# Patient Record
Sex: MALE | Race: WHITE | NOT HISPANIC OR LATINO | Employment: FULL TIME | URBAN - NONMETROPOLITAN AREA
[De-identification: names, ages, dates, MRNs, and addresses within clinical notes are randomized per-mention and may not be internally consistent; named-entity substitution may affect disease eponyms.]

---
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
## 2024-04-23 ENCOUNTER — OFFICE VISIT (OUTPATIENT)
Dept: INTERNAL MEDICINE | Facility: CLINIC | Age: 19
End: 2024-04-23
Payer: COMMERCIAL

## 2024-04-23 VITALS
RESPIRATION RATE: 20 BRPM | TEMPERATURE: 98 F | OXYGEN SATURATION: 97 % | HEART RATE: 74 BPM | SYSTOLIC BLOOD PRESSURE: 131 MMHG | HEIGHT: 76 IN | WEIGHT: 255 LBS | BODY MASS INDEX: 31.05 KG/M2 | DIASTOLIC BLOOD PRESSURE: 84 MMHG

## 2024-04-23 DIAGNOSIS — J30.1 ALLERGIC RHINITIS DUE TO POLLEN, UNSPECIFIED SEASONALITY: ICD-10-CM

## 2024-04-23 DIAGNOSIS — J45.20 MILD INTERMITTENT ASTHMA WITHOUT COMPLICATION: ICD-10-CM

## 2024-04-23 DIAGNOSIS — J45.20 MILD INTERMITTENT ASTHMA WITHOUT COMPLICATION: Primary | ICD-10-CM

## 2024-04-23 PROCEDURE — 99214 OFFICE O/P EST MOD 30 MIN: CPT | Performed by: INTERNAL MEDICINE

## 2024-04-23 RX ORDER — MONTELUKAST SODIUM 10 MG/1
10 TABLET ORAL NIGHTLY
Qty: 90 TABLET | Refills: 1 | Status: SHIPPED | OUTPATIENT
Start: 2024-04-23

## 2024-04-23 RX ORDER — ALBUTEROL SULFATE AND BUDESONIDE 90; 80 UG/1; UG/1
1 AEROSOL, METERED RESPIRATORY (INHALATION) EVERY 6 HOURS
Qty: 10.7 G | Refills: 4 | Status: SHIPPED | OUTPATIENT
Start: 2024-04-23

## 2024-04-23 RX ORDER — ALBUTEROL SULFATE AND BUDESONIDE 90; 80 UG/1; UG/1
1 AEROSOL, METERED RESPIRATORY (INHALATION) EVERY 6 HOURS
Qty: 10.7 G | Refills: 4 | Status: SHIPPED | OUTPATIENT
Start: 2024-04-23 | End: 2024-04-23 | Stop reason: SDUPTHER

## 2024-04-23 RX ORDER — MONTELUKAST SODIUM 10 MG/1
10 TABLET ORAL NIGHTLY
Qty: 90 TABLET | Refills: 1 | Status: SHIPPED | OUTPATIENT
Start: 2024-04-23 | End: 2024-04-23 | Stop reason: SDUPTHER

## 2024-04-23 NOTE — PROGRESS NOTES
"Chief Complaint  Asthma (Current living situation has changed causing flare up)    Subjective        Dylon Goldstein presents to [facility]  Patient is here complaining of asthma, he states he also has been having allergies, he currently is living with his girlfriend and she has a cat and he has an allergy to cats   Asthma  His past medical history is significant for asthma.       Objective   Vital Signs:  /84   Pulse 74   Temp 98 °F (36.7 °C)   Resp 20   Ht 193 cm (75.98\")   Wt 116 kg (255 lb)   SpO2 97%   BMI 31.05 kg/m²   Estimated body mass index is 31.05 kg/m² as calculated from the following:    Height as of this encounter: 193 cm (75.98\").    Weight as of this encounter: 116 kg (255 lb).       BMI is >= 30 and <35. (Class 1 Obesity). The following options were offered after discussion;: weight loss educational material (shared in after visit summary), exercise counseling/recommendations, and nutrition counseling/recommendations      Physical Exam  Vitals and nursing note reviewed.   Constitutional:       General: He is not in acute distress.     Appearance: Normal appearance. He is not diaphoretic.   HENT:      Head: Normocephalic and atraumatic.      Right Ear: External ear normal.      Left Ear: External ear normal.      Nose: Nose normal.   Eyes:      Extraocular Movements: Extraocular movements intact.      Conjunctiva/sclera: Conjunctivae normal.   Neck:      Trachea: Trachea normal.   Cardiovascular:      Rate and Rhythm: Normal rate and regular rhythm.      Heart sounds: Normal heart sounds.   Pulmonary:      Effort: Pulmonary effort is normal. No respiratory distress.   Abdominal:      General: Abdomen is flat.   Musculoskeletal:      Cervical back: Neck supple.      Comments: Moves all limbs   Skin:     General: Skin is warm and dry.      Findings: No erythema.   Neurological:      Mental Status: He is alert and oriented to person, place, and time.      " Comments: No gross motor or sensory deficits        Result Review :    The following data was reviewed by: Katie Jacinto MD on 04/23/2024:                 Assessment and Plan     Diagnoses and all orders for this visit:    1. Mild intermittent asthma without complication (Primary)  -     Discontinue: Albuterol-Budesonide (Airsupra) 90-80 MCG/ACT aerosol; Inhale 1 puff Every 6 (Six) Hours.  Dispense: 10.7 g; Refill: 4  -     Discontinue: montelukast (Singulair) 10 MG tablet; Take 1 tablet by mouth Every Night.  Dispense: 90 tablet; Refill: 1    2. Allergic rhinitis due to pollen, unspecified seasonality  -     Discontinue: montelukast (Singulair) 10 MG tablet; Take 1 tablet by mouth Every Night.  Dispense: 90 tablet; Refill: 1    Plan:  1.  Mild intermittent asthma: Will give a trial with a supra inhaler, and also with singular 10 mg daily   2. allergic rhinitis: Will give a trial with Singulair 10 mg daily, OTC antihistamine advised  for acute episodes         Follow Up     Return in about 23 weeks (around 10/1/2024) for Annual.  Patient was given instructions and counseling regarding his condition or for health maintenance advice. Please see specific information pulled into the AVS if appropriate.         Answers submitted by the patient for this visit:  Primary Reason for Visit (Submitted on 4/22/2024)  What is the primary reason for your visit?: Other  Other (Submitted on 4/22/2024)  Please describe your symptoms.: Allergies and asthma  Have you had these symptoms before?: Yes  How long have you been having these symptoms?: Greater than 2 weeks  Please list any medications you are currently taking for this condition.: Albuterol Sulfate inhaler  Please describe any probable cause for these symptoms. : Cat allergy triggers my asthma

## 2024-04-23 NOTE — TELEPHONE ENCOUNTER
"    Caller: Dylon Goldstein \"Ismael\"    Relationship: Self    Best call back number:[phone]     Requested Prescriptions:   Requested Prescriptions     Pending Prescriptions Disp Refills    montelukast (Singulair) 10 MG tablet 90 tablet 1     Sig: Take 1 tablet by mouth Every Night.    Albuterol-Budesonide (Airsupra) 90-80 MCG/ACT aerosol 10.7 g 4     Sig: Inhale 1 puff Every 6 (Six) Hours.        Pharmacy where request should be sent: [id number] - [facility] [address] [place] [address] [address] AT [facility] [phone] [facility] [phone]      Last office visit with prescribing clinician: 4/23/2024   Last telemedicine visit with prescribing clinician: Visit date not found   Next office visit with prescribing clinician: 10/1/2024     Additional details provided by patient: WAS NOT RECEIVED AT OTHER PHARMACY    Does the patient have less than a 3 day supply:  [] Yes  [x] No    Would you like a call back once the refill request has been completed: [] Yes [x] No    If the office needs to give you a call back, can they leave a voicemail: [] Yes [x] No    Juan Malik Rep   04/23/24 14:47 EDT       "

## 2024-10-01 ENCOUNTER — OFFICE VISIT (OUTPATIENT)
Dept: INTERNAL MEDICINE | Facility: CLINIC | Age: 19
End: 2024-10-01
Payer: COMMERCIAL

## 2024-10-01 VITALS
SYSTOLIC BLOOD PRESSURE: 133 MMHG | WEIGHT: 273 LBS | BODY MASS INDEX: 33.24 KG/M2 | OXYGEN SATURATION: 99 % | TEMPERATURE: 97.8 F | DIASTOLIC BLOOD PRESSURE: 82 MMHG | RESPIRATION RATE: 20 BRPM | HEART RATE: 62 BPM | HEIGHT: 76 IN

## 2024-10-01 DIAGNOSIS — J45.20 MILD INTERMITTENT ASTHMA WITHOUT COMPLICATION: ICD-10-CM

## 2024-10-01 DIAGNOSIS — G47.33 OSA (OBSTRUCTIVE SLEEP APNEA): ICD-10-CM

## 2024-10-01 DIAGNOSIS — J30.1 ALLERGIC RHINITIS DUE TO POLLEN, UNSPECIFIED SEASONALITY: ICD-10-CM

## 2024-10-01 DIAGNOSIS — Z00.00 ROUTINE GENERAL MEDICAL EXAMINATION AT A HEALTH CARE FACILITY: Primary | ICD-10-CM

## 2024-10-01 PROCEDURE — 99395 PREV VISIT EST AGE 18-39: CPT | Performed by: INTERNAL MEDICINE

## 2024-10-01 RX ORDER — MONTELUKAST SODIUM 10 MG/1
10 TABLET ORAL NIGHTLY
Qty: 90 TABLET | Refills: 1 | Status: SHIPPED | OUTPATIENT
Start: 2024-10-01 | End: 2024-10-01 | Stop reason: SDUPTHER

## 2024-10-01 RX ORDER — ALBUTEROL SULFATE AND BUDESONIDE 90; 80 UG/1; UG/1
1 AEROSOL, METERED RESPIRATORY (INHALATION) EVERY 6 HOURS
Qty: 10.7 G | Refills: 5 | Status: SHIPPED | OUTPATIENT
Start: 2024-10-01 | End: 2024-10-01 | Stop reason: SDUPTHER

## 2024-10-01 RX ORDER — ALBUTEROL SULFATE AND BUDESONIDE 90; 80 UG/1; UG/1
1 AEROSOL, METERED RESPIRATORY (INHALATION) EVERY 6 HOURS
Qty: 10.7 G | Refills: 11 | Status: SHIPPED | OUTPATIENT
Start: 2024-10-01

## 2024-10-01 RX ORDER — MONTELUKAST SODIUM 10 MG/1
10 TABLET ORAL NIGHTLY
Qty: 90 TABLET | Refills: 3 | Status: SHIPPED | OUTPATIENT
Start: 2024-10-01

## 2024-10-01 NOTE — PROGRESS NOTES
"Subjective   Dylon Goldstein is a 19 y.o. male.     Chief Complaint   Patient presents with    Annual Exam       History of Present Illness   Patient is here for annual physical, he declines all vaccines, he needs refills on his medication for allergies and asthma which is stable, he states he would like to be referred to pulmonary for RYAN , he has noted to have gained weight and is advised to follow diet and exercise     Past Medical History:   Diagnosis Date    Allergic     Asthma        Past Surgical History:   Procedure Laterality Date    TONSILLECTOMY      WISDOM TOOTH EXTRACTION Bilateral        Family History   Problem Relation Age of Onset    Hypertension Mother     Hypertension Father     Thyroid disease Father     Hyperlipidemia Father     Hypertension Sister     Cancer Maternal Grandmother     Cancer Maternal Grandfather     Cancer Paternal Grandmother     Cancer Paternal Grandfather     Hypertension Sister         reports that he has never smoked. He has never used smokeless tobacco. He reports that he does not drink alcohol and does not use drugs.    No Known Allergies        Current Outpatient Medications:     Albuterol-Budesonide (Airsupra) 90-80 MCG/ACT aerosol, Inhale 1 puff Every 6 (Six) Hours., Disp: 10.7 g, Rfl: 11    montelukast (Singulair) 10 MG tablet, Take 1 tablet by mouth Every Night., Disp: 90 tablet, Rfl: 3      Objective   Blood pressure 133/82, pulse 62, temperature 97.8 °F (36.6 °C), resp. rate 20, height 193 cm (75.98\"), weight 124 kg (273 lb), SpO2 99%.    Physical Exam  Vitals and nursing note reviewed.   Constitutional:       General: He is not in acute distress.     Appearance: Normal appearance. He is not diaphoretic.   HENT:      Head: Normocephalic and atraumatic.      Right Ear: External ear normal.      Left Ear: External ear normal.      Nose: Nose normal.   Eyes:      Extraocular Movements: Extraocular movements intact.      Conjunctiva/sclera: Conjunctivae normal. "   Neck:      Trachea: Trachea normal.   Cardiovascular:      Rate and Rhythm: Normal rate and regular rhythm.      Heart sounds: Normal heart sounds.   Pulmonary:      Effort: Pulmonary effort is normal. No respiratory distress.   Abdominal:      General: Abdomen is flat.   Musculoskeletal:      Cervical back: Neck supple.      Comments: Moves all limbs   Skin:     General: Skin is warm and dry.      Findings: No erythema.   Neurological:      Mental Status: He is alert and oriented to person, place, and time.      Comments: No gross motor or sensory deficits                Results for orders placed or performed during the hospital encounter of 06/06/23   POC Rapid Strep A    Specimen: Swab   Result Value Ref Range    Rapid Strep A Screen Negative     Internal Control Passed     Lot Number [id number]     Expiration Date 09-30-24          Assessment & Plan   Diagnoses and all orders for this visit:    1. Routine general medical examination at a health care facility (Primary)  -     CBC (No Diff)  -     Comprehensive Metabolic Panel  -     Lipid Panel    2. Mild intermittent asthma without complication  -     Discontinue: montelukast (Singulair) 10 MG tablet; Take 1 tablet by mouth Every Night.  Dispense: 90 tablet; Refill: 1  -     Discontinue: Albuterol-Budesonide (Airsupra) 90-80 MCG/ACT aerosol; Inhale 1 puff Every 6 (Six) Hours.  Dispense: 10.7 g; Refill: 5  -     Albuterol-Budesonide (Airsupra) 90-80 MCG/ACT aerosol; Inhale 1 puff Every 6 (Six) Hours.  Dispense: 10.7 g; Refill: 11  -     montelukast (Singulair) 10 MG tablet; Take 1 tablet by mouth Every Night.  Dispense: 90 tablet; Refill: 3    3. Allergic rhinitis due to pollen, unspecified seasonality  -     Discontinue: montelukast (Singulair) 10 MG tablet; Take 1 tablet by mouth Every Night.  Dispense: 90 tablet; Refill: 1  -     montelukast (Singulair) 10 MG tablet; Take 1 tablet by mouth Every Night.  Dispense: 90 tablet; Refill: 3    4. RYAN (obstructive  sleep apnea)  -     Ambulatory Referral to Pulmonology        plan:  1.physical: Physical exam conducted today,  Will obtain fasting lab work,   Impression: healthy adult Patient.  Diet and exercise counseled. Screening lab work includes glucose, lipid profile , complete blood count and comprehensive panel . The risks and benefits of immunizations were discussed and immunizations are up to date. Advice and education were given regarding nutrition and aerobic exercise. Patient discussion: discussed with the patient.  Physical with preventive exam as well as age and risk appropriate counseling completed.   Patient Discussion: plan discussed with the patient, follow-up visit needed in one year. Medication Review and medication list updated  Advised  regular weight-bearing exercise  2.  Asthma: Refilled Singulair , trial with Airsupra  3.  Allergic rhinitis: Refill Singulair  4.  RYAN: Refer patient to pulmonary         Katie Jacinto MD

## 2024-10-04 ENCOUNTER — OFFICE VISIT (OUTPATIENT)
Dept: SLEEP MEDICINE | Age: 19
End: 2024-10-04
Payer: COMMERCIAL

## 2024-10-04 VITALS
HEIGHT: 76 IN | DIASTOLIC BLOOD PRESSURE: 70 MMHG | HEART RATE: 72 BPM | SYSTOLIC BLOOD PRESSURE: 140 MMHG | WEIGHT: 275.8 LBS | TEMPERATURE: 97.1 F | OXYGEN SATURATION: 99 % | BODY MASS INDEX: 33.58 KG/M2

## 2024-10-04 DIAGNOSIS — G47.19 EXCESSIVE DAYTIME SLEEPINESS: ICD-10-CM

## 2024-10-04 DIAGNOSIS — R06.83 SNORING: ICD-10-CM

## 2024-10-04 DIAGNOSIS — G47.30 SLEEP APNEA, UNSPECIFIED TYPE: Primary | ICD-10-CM

## 2024-10-04 DIAGNOSIS — E66.811 OBESITY (BMI 30.0-34.9): ICD-10-CM

## 2024-10-04 NOTE — PROGRESS NOTES
Chief Complaint  Sleeping Problem, Snoring, Fatigue, Daytime Sleepiness, and Morning Headaches    Subjective     History of Present Illness:  Dylon Goldstein is a 19 y.o. male with asthma.  The patient is referred by Katie Jacinto MD with primary care.  Patient is referred for concerns for sleep disordered reathing/obstructive sleep apnea.  Review of self-reported questionnaire notes symptoms including snoring, witnessed apnea, morning headaches which been ongoing for 1-2 years.  Patient typically goes to bed at 6 AM waking at 12-1 p.m. on weekdays and 10 AM-12 PM on weekends.  He estimates an average of 6-7 hours of sleep per night and it takes approximately 30 minutes for him to get to sleep.  He denies use of tobacco, alcohol, or illicit drugs.  Patient drinks 1 regular cup coffee and 1-2 decaf elisabeth daily.    Further details are as follows:    Weatherford Scale is (out of 24): Total score: 0     Estimated average amount of sleep per night: 6-8 hours  Number of times he wakes up at night: not usually  Difficulty falling back asleep: not usually  It usually takes 30 minutes to go to sleep.  He feels sleepy upon waking up: yes  Rotating or night shift work: yes    Drowsiness/Sleepiness:  He exhibits the following:  excessive daytime sleepiness  excessive daytime fatigue    Snoring/Breathing:  He exhibits the following:  loud snoring, quits breathing at night, awakens gasping for breath, and morning headaches    Head Injury:  He exhibits the following:  No    Reflux:  He describes the following:  Not waking     Narcolepsy:  He exhibits the following:  none    RLS/PLMs:  He describes the following:  moves or jerks during sleep    Insomnia:  He describes the following:  None     Parasomnia:  He exhibits the following:  sleep talks    Weight:       10/04/24  1316   Weight: 125 kg (275 lb 12.8 oz)      Weight change in the last year:  gain: 20 lbs    The patient's relevant past medical, surgical, family, and social  "history reviewed and updated in Epic as appropriate.    Review of Systems   Constitutional: Negative.    HENT: Negative.     Eyes: Negative.    Respiratory: Negative.     Cardiovascular: Negative.    Gastrointestinal: Negative.    Endocrine: Negative.    Genitourinary: Negative.    Musculoskeletal: Negative.    Skin: Negative.    Allergic/Immunologic: Negative.    Neurological: Negative.    Hematological: Negative.    Psychiatric/Behavioral: Negative.     All other systems reviewed and are negative.      PMH:    Past Medical History:   Diagnosis Date    Allergic     Asthma      Past Surgical History:   Procedure Laterality Date    TONSILLECTOMY      WISDOM TOOTH EXTRACTION Bilateral        No Known Allergies    MEDS:  Prior to Admission medications    Medication Sig Start Date End Date Taking? Authorizing Provider   Albuterol-Budesonide (Airsupra) 90-80 MCG/ACT aerosol Inhale 1 puff Every 6 (Six) Hours. 10/1/24   Katie Jacinto MD   montelukast (Singulair) 10 MG tablet Take 1 tablet by mouth Every Night. 10/1/24   Katie Jacinto MD       FH:  Family History   Problem Relation Age of Onset    Hypertension Mother     Hypertension Father     Thyroid disease Father     Hyperlipidemia Father     Hypertension Sister     Cancer Maternal Grandmother     Cancer Maternal Grandfather     Cancer Paternal Grandmother     Cancer Paternal Grandfather     Hypertension Sister        Objective   Vital Signs:  /70 (BP Location: Left arm, Patient Position: Sitting, Cuff Size: Adult)   Pulse 72   Temp 97.1 °F (36.2 °C) (Temporal)   Ht 193 cm (75.98\")   Wt 125 kg (275 lb 12.8 oz)   SpO2 99%   BMI 33.59 kg/m²     Patient's (Body mass index is 33.59 kg/m².) indicates that they are obese (BMI >30) with health related conditions that include obstructive sleep apnea, hypertension, coronary heart disease, diabetes mellitus, and impaired fasting glucose . Weight is worsening. BMI is is above average; BMI management plan is " completed. We discussed portion control and increasing exercise.            Physical Exam  Vitals reviewed.   Constitutional:       Appearance: Normal appearance.   HENT:      Head: Normocephalic and atraumatic.      Nose: Nose normal.      Mouth/Throat:      Mouth: Mucous membranes are moist.   Cardiovascular:      Rate and Rhythm: Normal rate and regular rhythm.      Heart sounds: No murmur heard.     No friction rub. No gallop.   Pulmonary:      Effort: Pulmonary effort is normal. No respiratory distress.      Breath sounds: Normal breath sounds. No wheezing or rhonchi.   Neurological:      Mental Status: He is alert and oriented to person, place, and time.   Psychiatric:         Behavior: Behavior normal.       Mallampati Score: III (soft and hard palate and base of uvula visible)    Result Review :              Assessment and Plan  Dylon Goldstein is a 19 y.o. male with asthma who presents for further evaluation of excessive daytime sleepiness and fatigue, nonrestorative sleep, and concerns for sleep disordered breathing and obstructive sleep apnea. The patient's symptoms, particularly snoring and apneic events, are concerning for significant sleep disordered breathing and obstructive sleep apnea. We will obtain a home sleep test for further evaluation.  The patient will return for follow-up and recommendations after test.  I have discussed weight loss as it pertains to obstructive sleep apnea.    Diagnoses and all orders for this visit:    1. Sleep apnea, unspecified type (Primary)  -     Home Sleep Study; Future    2. Snoring  -     Home Sleep Study; Future    3. Excessive daytime sleepiness  -     Home Sleep Study; Future    4. Obesity (BMI 30.0-34.9)                 I discussed the consequences of uncontrolled sleep apnea including hypertension, heart disease, diabetes, stroke, and dementia. I further discussed sleep apnea therapeutic options including CPAP, Weight loss, Oral dental appliance, and  surgery.         Follow Up  Return for Follow up after study.  Patient was given instructions and counseling regarding his condition or for health maintenance advice. Please see specific information pulled into the AVS if appropriate.     GUIDO Pierre, ACNP-BC  Pulmonology, Critical Care, and Sleep Medicine

## 2024-12-09 ENCOUNTER — HOSPITAL ENCOUNTER (OUTPATIENT)
Dept: SLEEP MEDICINE | Facility: HOSPITAL | Age: 19
Discharge: HOME | End: 2024-12-09
Admitting: NURSE PRACTITIONER
Payer: COMMERCIAL

## 2024-12-09 VITALS — HEIGHT: 76 IN | BODY MASS INDEX: 33.56 KG/M2 | WEIGHT: 275.57 LBS

## 2024-12-09 DIAGNOSIS — R06.83 SNORING: ICD-10-CM

## 2024-12-09 DIAGNOSIS — G47.30 SLEEP APNEA, UNSPECIFIED TYPE: ICD-10-CM

## 2024-12-09 DIAGNOSIS — G47.19 EXCESSIVE DAYTIME SLEEPINESS: ICD-10-CM

## 2024-12-09 PROCEDURE — 95800 SLP STDY UNATTENDED: CPT

## 2024-12-11 DIAGNOSIS — R06.83 SNORING: ICD-10-CM

## 2024-12-11 DIAGNOSIS — G47.33 OSA (OBSTRUCTIVE SLEEP APNEA): Primary | ICD-10-CM

## 2024-12-20 ENCOUNTER — TELEPHONE (OUTPATIENT)
Dept: SLEEP MEDICINE | Facility: CLINIC | Age: 19
End: 2024-12-20
Payer: COMMERCIAL

## 2024-12-20 NOTE — TELEPHONE ENCOUNTER
Patient is agreeable to pap therapy, order has been sent to [facility] 12/20/24 AB